# Patient Record
Sex: MALE | Race: WHITE | Employment: FULL TIME | URBAN - METROPOLITAN AREA
[De-identification: names, ages, dates, MRNs, and addresses within clinical notes are randomized per-mention and may not be internally consistent; named-entity substitution may affect disease eponyms.]

---

## 2020-11-27 ENCOUNTER — HOSPITAL ENCOUNTER (EMERGENCY)
Age: 35
Discharge: HOME | End: 2020-11-27

## 2020-11-27 VITALS
WEIGHT: 130 LBS | BODY MASS INDEX: 18.61 KG/M2 | SYSTOLIC BLOOD PRESSURE: 114 MMHG | HEART RATE: 82 BPM | HEIGHT: 70 IN | OXYGEN SATURATION: 100 % | RESPIRATION RATE: 18 BRPM | DIASTOLIC BLOOD PRESSURE: 70 MMHG | TEMPERATURE: 98 F

## 2020-11-27 PROCEDURE — 6360000002 HC RX W HCPCS: Performed by: PHYSICIAN ASSISTANT

## 2020-11-27 PROCEDURE — 2500000003 HC RX 250 WO HCPCS: Performed by: PHYSICIAN ASSISTANT

## 2020-11-27 PROCEDURE — 99283 EMERGENCY DEPT VISIT LOW MDM: CPT

## 2020-11-27 PROCEDURE — 90471 IMMUNIZATION ADMIN: CPT | Performed by: PHYSICIAN ASSISTANT

## 2020-11-27 PROCEDURE — 90715 TDAP VACCINE 7 YRS/> IM: CPT | Performed by: PHYSICIAN ASSISTANT

## 2020-11-27 PROCEDURE — 65220 REMOVE FOREIGN BODY FROM EYE: CPT

## 2020-11-27 RX ORDER — PROPARACAINE HYDROCHLORIDE 5 MG/ML
1 SOLUTION/ DROPS OPHTHALMIC ONCE
Status: COMPLETED | OUTPATIENT
Start: 2020-11-27 | End: 2020-11-27

## 2020-11-27 RX ORDER — ERYTHROMYCIN 5 MG/G
OINTMENT OPHTHALMIC
Qty: 1 TUBE | Refills: 0 | Status: SHIPPED | OUTPATIENT
Start: 2020-11-27

## 2020-11-27 RX ORDER — BALANCED SALT SOLUTION ENRICHED WITH BICARBONATE, DEXTROSE, AND GLUTATHIONE
KIT INTRAOCULAR ONCE
Status: DISCONTINUED | OUTPATIENT
Start: 2020-11-27 | End: 2020-11-27 | Stop reason: HOSPADM

## 2020-11-27 RX ADMIN — PROPARACAINE HYDROCHLORIDE 1 DROP: 5 SOLUTION/ DROPS OPHTHALMIC at 09:37

## 2020-11-27 RX ADMIN — TETANUS TOXOID, REDUCED DIPHTHERIA TOXOID AND ACELLULAR PERTUSSIS VACCINE, ADSORBED 0.5 ML: 5; 2.5; 8; 8; 2.5 SUSPENSION INTRAMUSCULAR at 09:33

## 2020-11-27 ASSESSMENT — VISUAL ACUITY
OU: 20/20
OD: 20/30
OS: 20/30

## 2020-11-27 ASSESSMENT — ENCOUNTER SYMPTOMS
PHOTOPHOBIA: 0
EYE REDNESS: 1
NAUSEA: 0
EYE DISCHARGE: 0
SHORTNESS OF BREATH: 0
EYE PAIN: 1
FACIAL SWELLING: 0
VOMITING: 0
ABDOMINAL PAIN: 0
CONSTIPATION: 0
DIARRHEA: 0
EYE ITCHING: 0

## 2020-11-27 ASSESSMENT — PAIN SCALES - GENERAL: PAINLEVEL_OUTOF10: 0

## 2020-11-27 ASSESSMENT — PAIN DESCRIPTION - ORIENTATION: ORIENTATION: LEFT

## 2020-11-27 ASSESSMENT — PAIN DESCRIPTION - LOCATION: LOCATION: EYE

## 2020-11-27 NOTE — ED NOTES
Bed: 06  Expected date:   Expected time:   Means of arrival: Walk In  Comments:     Gelacio Trevino RN  11/27/20 8313

## 2020-11-27 NOTE — ED PROVIDER NOTES
**EVALUATED BY BHAVIK**    [address] [facility]  eMERGENCY dEPARTMENT eNCOUnter    Pt Name: Rickey Pittman  MRN: [id number]  [place] 1985  Date of evaluation: 11/27/2020  Provider: BERENICE Brown    Chief Complaint:    Chief Complaint   Patient presents with    Foreign Body     Pt toERwith c/o possible metal or cement chip from work. swellingto left eye. since wednesday       Nursing Notes, Past Medical Hx, Past Surgical Hx, Social Hx, Allergies, and Family Hx were all reviewed and agreedwith or any disagreements were addressed in the HPI.    HPI:  (Location, Duration, Timing, Severity, Quality, Assoc Sx, Context, Modifying factors)  This is a  38 y.o. male who presents to the emergency department with complaints of foreign body in left eye that occurred 2 days ago while at work. He states that it is either a metal shaving or piece of cement chip from work. He has had swelling to the left eyelid that is been minimal.  Denies any double vision but does report blurred vision. Does not wear contacts or glasses. No change in visual fields. Denies fevers, chills, or pain with movement of left eye. Tetanus is not up-to-date. No allergies to medications. Currently denies having any pain, only pain is when he sleeps at nighttime. He states that he is able to see the foreign body himself. No aggravating or alleviating factors. No radiation of symptoms. No other complaints at time of evaluation. All other systems were reviewed and are negative. Past Medical/Surgical History:      Diagnosis Date    Crohn disease ([facility] [address]          Procedure Laterality Date    ABDOMEN SURGERY      APPENDECTOMY         Medications:  Previous Medications    HYDROCODONE-ACETAMINOPHEN (VICODIN) 5-500 MG PER TABLET    Take 1 tablet by mouth every 6 hours as needed. Review of Systems:  Review of Systems   Constitutional: Negative for chills, fatigue and fever. HENT: Negative for facial swelling. report blurred vision. Does not wear contacts or glasses. No change in visual fields. Denies fevers, chills, or pain with movement of left eye. Tetanus is not up-to-date. No allergies to medications. Currently denies having any pain, only pain is when he sleeps at nighttime. He states that he is able to see the foreign body himself. No aggravating or alleviating factors. No radiation of symptoms. No other complaints at time of evaluation. Patient has a small retained foreign body of the left eye, this will be removed at bedside. Patient tolerated procedure well after appropriate anesthesia of the left eye. No visual field deficits. Visual acuity is documented. No evidence of periorbital or orbital cellulitis. No pain with EOMs. Patient will be placed on antibiotic ointment and referred to [facility] if symptoms do not improve. Outpatient follow-up with PCP. Otherwise well-appearing at time of discharge home. Erythromycin ophthalmic ointment prescribed for home. Tetanus updated. Patient made aware that a small amount of metal could still be retained in the cornea and agreeable with outpatient [facility] follow-up    The patient tolerated their visit well. I have evaluated the patient with physician available for consultation as needed. I have discussed the findings of today's workup with the patient and addressed the patient's questions and concerns. Important warning signs as well as new or worsening symptoms which wouldnecessitate immediate return to the ED were discussed. The plan is to discharge from the ED at this time, and the patient is in stable condition. The patient acknowledged understanding is agreeable with this plan. CLINICAL IMPRESSION:  1. Intraocular foreign body of left eye, initial encounter    2. Need for Tdap vaccination    3.  Abrasion of left cornea, initial encounter        DISPOSITION  11/27/2020 09:12:34 AM      PATIENT REFERRED TO:  [facility] [address] [id number]  [phone]    Schedule an appointment as soon as possible for a visit   If symptoms worsen    [facility] Emergency Department  [address]  Go to   If symptoms worsen      DISCHARGE MEDICATIONS:  Discharge Medication List as of 11/27/2020 10:31 AM      START taking these medications    Details   erythromycin (ROMYCIN) 5 MG/GM ophthalmic ointment Instill 1 cm ribbon into affected eye(s) 6 times daily, Disp-1 Tube,R-0, Print                    (Please note the MDM and HPI sections of this note were completed with avoice recognition program.  Efforts were made to edit the dictations but occasionally words are mis-transcribed.)    Electronically signed, BERENICE Quiroz,             BERENICE Canchola  11/27/20 5560